# Patient Record
Sex: FEMALE | Race: WHITE | NOT HISPANIC OR LATINO
[De-identification: names, ages, dates, MRNs, and addresses within clinical notes are randomized per-mention and may not be internally consistent; named-entity substitution may affect disease eponyms.]

---

## 2021-10-05 ENCOUNTER — ASOB RESULT (OUTPATIENT)
Age: 26
End: 2021-10-05

## 2021-10-05 ENCOUNTER — TRANSCRIPTION ENCOUNTER (OUTPATIENT)
Age: 26
End: 2021-10-05

## 2021-10-05 ENCOUNTER — APPOINTMENT (OUTPATIENT)
Dept: ANTEPARTUM | Facility: CLINIC | Age: 26
End: 2021-10-05
Payer: MEDICAID

## 2021-10-05 PROCEDURE — 76813 OB US NUCHAL MEAS 1 GEST: CPT

## 2021-10-05 PROCEDURE — 76801 OB US < 14 WKS SINGLE FETUS: CPT

## 2021-10-05 PROCEDURE — 36416 COLLJ CAPILLARY BLOOD SPEC: CPT

## 2021-11-23 ENCOUNTER — ASOB RESULT (OUTPATIENT)
Age: 26
End: 2021-11-23

## 2021-11-23 ENCOUNTER — APPOINTMENT (OUTPATIENT)
Dept: ANTEPARTUM | Facility: CLINIC | Age: 26
End: 2021-11-23
Payer: COMMERCIAL

## 2021-11-23 PROCEDURE — 76805 OB US >/= 14 WKS SNGL FETUS: CPT

## 2021-11-26 ENCOUNTER — APPOINTMENT (OUTPATIENT)
Dept: ANTEPARTUM | Facility: CLINIC | Age: 26
End: 2021-11-26

## 2021-12-06 ENCOUNTER — NON-APPOINTMENT (OUTPATIENT)
Age: 26
End: 2021-12-06

## 2021-12-15 ENCOUNTER — APPOINTMENT (OUTPATIENT)
Dept: CARDIOLOGY | Facility: CLINIC | Age: 26
End: 2021-12-15
Payer: COMMERCIAL

## 2021-12-15 ENCOUNTER — NON-APPOINTMENT (OUTPATIENT)
Age: 26
End: 2021-12-15

## 2021-12-15 VITALS
DIASTOLIC BLOOD PRESSURE: 62 MMHG | WEIGHT: 117 LBS | TEMPERATURE: 97.9 F | BODY MASS INDEX: 21.53 KG/M2 | OXYGEN SATURATION: 99 % | SYSTOLIC BLOOD PRESSURE: 100 MMHG | HEART RATE: 76 BPM | HEIGHT: 62 IN

## 2021-12-15 DIAGNOSIS — Z00.00 ENCOUNTER FOR GENERAL ADULT MEDICAL EXAMINATION W/OUT ABNORMAL FINDINGS: ICD-10-CM

## 2021-12-15 DIAGNOSIS — R00.2 PALPITATIONS: ICD-10-CM

## 2021-12-15 DIAGNOSIS — R06.02 SHORTNESS OF BREATH: ICD-10-CM

## 2021-12-15 DIAGNOSIS — R53.83 OTHER FATIGUE: ICD-10-CM

## 2021-12-15 PROCEDURE — 93000 ELECTROCARDIOGRAM COMPLETE: CPT

## 2021-12-15 PROCEDURE — 99214 OFFICE O/P EST MOD 30 MIN: CPT

## 2021-12-15 RX ORDER — DOXYLAMINE SUCCINATE 25 MG
25 TABLET ORAL
Refills: 0 | Status: ACTIVE | COMMUNITY
Start: 2021-12-15

## 2021-12-15 RX ORDER — LORATADINE 10 MG
27-0.8 TABLET ORAL
Refills: 0 | Status: ACTIVE | COMMUNITY

## 2021-12-15 NOTE — REVIEW OF SYSTEMS
[Feeling Fatigued] : feeling fatigued [SOB] : shortness of breath [Palpitations] : palpitations [Negative] : Heme/Lymph

## 2021-12-16 NOTE — HISTORY OF PRESENT ILLNESS
[FreeTextEntry1] : Pt presents as new pt referred by Dr. Kulkarni. \par \par Pt is currently 22 weeks pregnant and states that for the past 16 weeks she has been experiencing generalized fatigue and weakness. States that after she does normal activities like walking her dog she feels like she has to lay down. pt also states she has been experiencing a "racing heart beat" and shortness of breath on exertion. Pt denies any chest pain, dizziness, nausea/vomiting, abdominal pain, fever or cough.

## 2021-12-17 ENCOUNTER — APPOINTMENT (OUTPATIENT)
Dept: CARDIOLOGY | Facility: CLINIC | Age: 26
End: 2021-12-17
Payer: COMMERCIAL

## 2021-12-17 LAB
25(OH)D3 SERPL-MCNC: 45.2 NG/ML
ALBUMIN SERPL ELPH-MCNC: 4 G/DL
ALP BLD-CCNC: 58 U/L
ALT SERPL-CCNC: 154 U/L
ANION GAP SERPL CALC-SCNC: 10 MMOL/L
AST SERPL-CCNC: 63 U/L
BASOPHILS # BLD AUTO: 0.02 K/UL
BASOPHILS NFR BLD AUTO: 0.3 %
BILIRUB SERPL-MCNC: 0.2 MG/DL
BUN SERPL-MCNC: 11 MG/DL
CALCIUM SERPL-MCNC: 9.2 MG/DL
CHLORIDE SERPL-SCNC: 103 MMOL/L
CO2 SERPL-SCNC: 23 MMOL/L
CREAT SERPL-MCNC: 0.5 MG/DL
EOSINOPHIL # BLD AUTO: 0.14 K/UL
EOSINOPHIL NFR BLD AUTO: 1.8 %
FERRITIN SERPL-MCNC: 36 NG/ML
FOLATE SERPL-MCNC: >20 NG/ML
GLUCOSE SERPL-MCNC: 73 MG/DL
HCT VFR BLD CALC: 38 %
HGB BLD-MCNC: 12 G/DL
IMM GRANULOCYTES NFR BLD AUTO: 0.8 %
IRON SERPL-MCNC: 105 UG/DL
LYMPHOCYTES # BLD AUTO: 1.81 K/UL
LYMPHOCYTES NFR BLD AUTO: 22.6 %
MAN DIFF?: NORMAL
MCHC RBC-ENTMCNC: 30.4 PG
MCHC RBC-ENTMCNC: 31.6 GM/DL
MCV RBC AUTO: 96.2 FL
MONOCYTES # BLD AUTO: 0.63 K/UL
MONOCYTES NFR BLD AUTO: 7.9 %
NEUTROPHILS # BLD AUTO: 5.34 K/UL
NEUTROPHILS NFR BLD AUTO: 66.6 %
PLATELET # BLD AUTO: 259 K/UL
POTASSIUM SERPL-SCNC: 5 MMOL/L
PROT SERPL-MCNC: 6.7 G/DL
RBC # BLD: 3.95 M/UL
RBC # FLD: 13.6 %
SODIUM SERPL-SCNC: 136 MMOL/L
T3FREE SERPL-MCNC: 2.64 PG/ML
T4 FREE SERPL-MCNC: 1 NG/DL
TSH SERPL-ACNC: 0.63 UIU/ML
VIT B12 SERPL-MCNC: 924 PG/ML
WBC # FLD AUTO: 8 K/UL

## 2021-12-17 PROCEDURE — 93306 TTE W/DOPPLER COMPLETE: CPT

## 2022-01-03 ENCOUNTER — NON-APPOINTMENT (OUTPATIENT)
Age: 27
End: 2022-01-03

## 2022-02-03 ENCOUNTER — ASOB RESULT (OUTPATIENT)
Age: 27
End: 2022-02-03

## 2022-02-03 ENCOUNTER — APPOINTMENT (OUTPATIENT)
Dept: ANTEPARTUM | Facility: CLINIC | Age: 27
End: 2022-02-03
Payer: COMMERCIAL

## 2022-02-03 PROCEDURE — 76819 FETAL BIOPHYS PROFIL W/O NST: CPT

## 2022-02-03 PROCEDURE — 76816 OB US FOLLOW-UP PER FETUS: CPT

## 2022-02-17 ENCOUNTER — APPOINTMENT (OUTPATIENT)
Dept: CARDIOLOGY | Facility: CLINIC | Age: 27
End: 2022-02-17

## 2022-03-01 ENCOUNTER — ASOB RESULT (OUTPATIENT)
Age: 27
End: 2022-03-01

## 2022-03-01 ENCOUNTER — APPOINTMENT (OUTPATIENT)
Dept: ANTEPARTUM | Facility: CLINIC | Age: 27
End: 2022-03-01
Payer: COMMERCIAL

## 2022-03-01 PROCEDURE — 76816 OB US FOLLOW-UP PER FETUS: CPT

## 2022-03-01 PROCEDURE — 76819 FETAL BIOPHYS PROFIL W/O NST: CPT

## 2022-04-15 ENCOUNTER — INPATIENT (INPATIENT)
Facility: HOSPITAL | Age: 27
LOS: 2 days | Discharge: HOME | End: 2022-04-18
Attending: SPECIALIST | Admitting: SPECIALIST
Payer: COMMERCIAL

## 2022-04-15 VITALS — HEART RATE: 93 BPM | DIASTOLIC BLOOD PRESSURE: 82 MMHG | SYSTOLIC BLOOD PRESSURE: 120 MMHG

## 2022-04-15 DIAGNOSIS — O26.899 OTHER SPECIFIED PREGNANCY RELATED CONDITIONS, UNSPECIFIED TRIMESTER: ICD-10-CM

## 2022-04-15 DIAGNOSIS — Z3A.00 WEEKS OF GESTATION OF PREGNANCY NOT SPECIFIED: ICD-10-CM

## 2022-04-15 NOTE — OB RN TRIAGE NOTE - FALL HARM RISK - UNIVERSAL INTERVENTIONS
Bed in lowest position, wheels locked, appropriate side rails in place/Call bell, personal items and telephone in reach/Instruct patient to call for assistance before getting out of bed or chair/Non-slip footwear when patient is out of bed/[place] to call system/Physically safe environment - no spills, clutter or unnecessary equipment/Purposeful Proactive Rounding/Room/bathroom lighting operational, light cord in reach

## 2022-04-16 ENCOUNTER — TRANSCRIPTION ENCOUNTER (OUTPATIENT)
Age: 27
End: 2022-04-16

## 2022-04-16 DIAGNOSIS — O42.90 PREMATURE RUPTURE OF MEMBRANES, UNSPECIFIED AS TO LENGTH OF TIME BETWEEN RUPTURE AND ONSET OF LABOR, UNSPECIFIED WEEKS OF GESTATION: ICD-10-CM

## 2022-04-16 LAB
BASOPHILS # BLD AUTO: 0.02 K/UL — SIGNIFICANT CHANGE UP (ref 0–0.2)
BASOPHILS NFR BLD AUTO: 0.2 % — SIGNIFICANT CHANGE UP (ref 0–2)
COVID-19 SPIKE DOMAIN AB INTERP: NEGATIVE — SIGNIFICANT CHANGE UP
COVID-19 SPIKE DOMAIN ANTIBODY RESULT: 0.4 U/ML — SIGNIFICANT CHANGE UP
EOSINOPHIL # BLD AUTO: 0.12 K/UL — SIGNIFICANT CHANGE UP (ref 0–0.5)
EOSINOPHIL NFR BLD AUTO: 1.4 % — SIGNIFICANT CHANGE UP (ref 0–6)
HCT VFR BLD CALC: 37.6 % — SIGNIFICANT CHANGE UP (ref 34.5–45)
HGB BLD-MCNC: 12.5 G/DL — SIGNIFICANT CHANGE UP (ref 11.5–15.5)
IANC: 5.98 K/UL — SIGNIFICANT CHANGE UP (ref 1.8–7.4)
IMM GRANULOCYTES NFR BLD AUTO: 0.4 % — SIGNIFICANT CHANGE UP (ref 0–1.5)
LYMPHOCYTES # BLD AUTO: 1.8 K/UL — SIGNIFICANT CHANGE UP (ref 1–3.3)
LYMPHOCYTES # BLD AUTO: 21 % — SIGNIFICANT CHANGE UP (ref 13–44)
MCHC RBC-ENTMCNC: 30.8 PG — SIGNIFICANT CHANGE UP (ref 27–34)
MCHC RBC-ENTMCNC: 33.2 GM/DL — SIGNIFICANT CHANGE UP (ref 32–36)
MCV RBC AUTO: 92.6 FL — SIGNIFICANT CHANGE UP (ref 80–100)
MONOCYTES # BLD AUTO: 0.61 K/UL — SIGNIFICANT CHANGE UP (ref 0–0.9)
MONOCYTES NFR BLD AUTO: 7.1 % — SIGNIFICANT CHANGE UP (ref 2–14)
NEUTROPHILS # BLD AUTO: 5.98 K/UL — SIGNIFICANT CHANGE UP (ref 1.8–7.4)
NEUTROPHILS NFR BLD AUTO: 69.9 % — SIGNIFICANT CHANGE UP (ref 43–77)
NRBC # BLD: 0 /100 WBCS — SIGNIFICANT CHANGE UP
NRBC # FLD: 0 K/UL — SIGNIFICANT CHANGE UP
PLATELET # BLD AUTO: 212 K/UL — SIGNIFICANT CHANGE UP (ref 150–400)
RBC # BLD: 4.06 M/UL — SIGNIFICANT CHANGE UP (ref 3.8–5.2)
RBC # FLD: 14.1 % — SIGNIFICANT CHANGE UP (ref 10.3–14.5)
SARS-COV-2 IGG+IGM SERPL QL IA: 0.4 U/ML — SIGNIFICANT CHANGE UP
SARS-COV-2 IGG+IGM SERPL QL IA: NEGATIVE — SIGNIFICANT CHANGE UP
SARS-COV-2 RNA SPEC QL NAA+PROBE: SIGNIFICANT CHANGE UP
T PALLIDUM AB TITR SER: NEGATIVE — SIGNIFICANT CHANGE UP
WBC # BLD: 8.56 K/UL — SIGNIFICANT CHANGE UP (ref 3.8–10.5)
WBC # FLD AUTO: 8.56 K/UL — SIGNIFICANT CHANGE UP (ref 3.8–10.5)

## 2022-04-16 PROCEDURE — 86077 PHYS BLOOD BANK SERV XMATCH: CPT

## 2022-04-16 RX ORDER — SODIUM CHLORIDE 9 MG/ML
1000 INJECTION, SOLUTION INTRAVENOUS ONCE
Refills: 0 | Status: COMPLETED | OUTPATIENT
Start: 2022-04-16 | End: 2022-04-16

## 2022-04-16 RX ORDER — OXYTOCIN 10 UNIT/ML
2 VIAL (ML) INJECTION
Qty: 30 | Refills: 0 | Status: DISCONTINUED | OUTPATIENT
Start: 2022-04-16 | End: 2022-04-16

## 2022-04-16 RX ORDER — LANOLIN
1 OINTMENT (GRAM) TOPICAL EVERY 6 HOURS
Refills: 0 | Status: DISCONTINUED | OUTPATIENT
Start: 2022-04-16 | End: 2022-04-18

## 2022-04-16 RX ORDER — IBUPROFEN 200 MG
600 TABLET ORAL EVERY 6 HOURS
Refills: 0 | Status: COMPLETED | OUTPATIENT
Start: 2022-04-16 | End: 2023-03-15

## 2022-04-16 RX ORDER — OXYTOCIN 10 UNIT/ML
333.33 VIAL (ML) INJECTION
Qty: 20 | Refills: 0 | Status: DISCONTINUED | OUTPATIENT
Start: 2022-04-16 | End: 2022-04-18

## 2022-04-16 RX ORDER — CHLORHEXIDINE GLUCONATE 213 G/1000ML
1 SOLUTION TOPICAL DAILY
Refills: 0 | Status: DISCONTINUED | OUTPATIENT
Start: 2022-04-16 | End: 2022-04-16

## 2022-04-16 RX ORDER — ONDANSETRON 8 MG/1
4 TABLET, FILM COATED ORAL
Refills: 0 | Status: DISCONTINUED | OUTPATIENT
Start: 2022-04-16 | End: 2022-04-16

## 2022-04-16 RX ORDER — SODIUM CHLORIDE 9 MG/ML
1000 INJECTION, SOLUTION INTRAVENOUS
Refills: 0 | Status: DISCONTINUED | OUTPATIENT
Start: 2022-04-16 | End: 2022-04-16

## 2022-04-16 RX ORDER — KETOROLAC TROMETHAMINE 30 MG/ML
30 SYRINGE (ML) INJECTION ONCE
Refills: 0 | Status: DISCONTINUED | OUTPATIENT
Start: 2022-04-16 | End: 2022-04-16

## 2022-04-16 RX ORDER — HYDROCORTISONE 1 %
1 OINTMENT (GRAM) TOPICAL EVERY 6 HOURS
Refills: 0 | Status: DISCONTINUED | OUTPATIENT
Start: 2022-04-16 | End: 2022-04-18

## 2022-04-16 RX ORDER — OXYCODONE HYDROCHLORIDE 5 MG/1
5 TABLET ORAL ONCE
Refills: 0 | Status: DISCONTINUED | OUTPATIENT
Start: 2022-04-16 | End: 2022-04-18

## 2022-04-16 RX ORDER — DIPHENHYDRAMINE HCL 50 MG
25 CAPSULE ORAL EVERY 6 HOURS
Refills: 0 | Status: DISCONTINUED | OUTPATIENT
Start: 2022-04-16 | End: 2022-04-18

## 2022-04-16 RX ORDER — AER TRAVELER 0.5 G/1
1 SOLUTION RECTAL; TOPICAL EVERY 4 HOURS
Refills: 0 | Status: DISCONTINUED | OUTPATIENT
Start: 2022-04-16 | End: 2022-04-18

## 2022-04-16 RX ORDER — SODIUM CHLORIDE 9 MG/ML
3 INJECTION INTRAMUSCULAR; INTRAVENOUS; SUBCUTANEOUS EVERY 8 HOURS
Refills: 0 | Status: DISCONTINUED | OUTPATIENT
Start: 2022-04-16 | End: 2022-04-18

## 2022-04-16 RX ORDER — DIBUCAINE 1 %
1 OINTMENT (GRAM) RECTAL EVERY 6 HOURS
Refills: 0 | Status: DISCONTINUED | OUTPATIENT
Start: 2022-04-16 | End: 2022-04-18

## 2022-04-16 RX ORDER — ONDANSETRON 8 MG/1
4 TABLET, FILM COATED ORAL ONCE
Refills: 0 | Status: COMPLETED | OUTPATIENT
Start: 2022-04-16 | End: 2022-04-16

## 2022-04-16 RX ORDER — SIMETHICONE 80 MG/1
80 TABLET, CHEWABLE ORAL EVERY 4 HOURS
Refills: 0 | Status: DISCONTINUED | OUTPATIENT
Start: 2022-04-16 | End: 2022-04-18

## 2022-04-16 RX ORDER — PRAMOXINE HYDROCHLORIDE 150 MG/15G
1 AEROSOL, FOAM RECTAL EVERY 4 HOURS
Refills: 0 | Status: DISCONTINUED | OUTPATIENT
Start: 2022-04-16 | End: 2022-04-18

## 2022-04-16 RX ORDER — METOCLOPRAMIDE HCL 10 MG
10 TABLET ORAL ONCE
Refills: 0 | Status: COMPLETED | OUTPATIENT
Start: 2022-04-16 | End: 2022-04-16

## 2022-04-16 RX ORDER — OXYTOCIN 10 UNIT/ML
333.33 VIAL (ML) INJECTION
Qty: 20 | Refills: 0 | Status: DISCONTINUED | OUTPATIENT
Start: 2022-04-16 | End: 2022-04-16

## 2022-04-16 RX ORDER — OXYCODONE HYDROCHLORIDE 5 MG/1
5 TABLET ORAL
Refills: 0 | Status: DISCONTINUED | OUTPATIENT
Start: 2022-04-16 | End: 2022-04-18

## 2022-04-16 RX ORDER — ACETAMINOPHEN 500 MG
975 TABLET ORAL
Refills: 0 | Status: DISCONTINUED | OUTPATIENT
Start: 2022-04-16 | End: 2022-04-18

## 2022-04-16 RX ORDER — BENZOCAINE 10 %
1 GEL (GRAM) MUCOUS MEMBRANE EVERY 6 HOURS
Refills: 0 | Status: DISCONTINUED | OUTPATIENT
Start: 2022-04-16 | End: 2022-04-18

## 2022-04-16 RX ORDER — TETANUS TOXOID, REDUCED DIPHTHERIA TOXOID AND ACELLULAR PERTUSSIS VACCINE, ADSORBED 5; 2.5; 8; 8; 2.5 [IU]/.5ML; [IU]/.5ML; UG/.5ML; UG/.5ML; UG/.5ML
0.5 SUSPENSION INTRAMUSCULAR ONCE
Refills: 0 | Status: DISCONTINUED | OUTPATIENT
Start: 2022-04-16 | End: 2022-04-18

## 2022-04-16 RX ORDER — MAGNESIUM HYDROXIDE 400 MG/1
30 TABLET, CHEWABLE ORAL
Refills: 0 | Status: DISCONTINUED | OUTPATIENT
Start: 2022-04-16 | End: 2022-04-18

## 2022-04-16 RX ADMIN — Medication 30 MILLIGRAM(S): at 20:21

## 2022-04-16 RX ADMIN — CHLORHEXIDINE GLUCONATE 1 APPLICATION(S): 213 SOLUTION TOPICAL at 01:30

## 2022-04-16 RX ADMIN — Medication 1000 MILLIUNIT(S)/MIN: at 19:59

## 2022-04-16 RX ADMIN — ONDANSETRON 4 MILLIGRAM(S): 8 TABLET, FILM COATED ORAL at 23:00

## 2022-04-16 RX ADMIN — SODIUM CHLORIDE 1000 MILLILITER(S): 9 INJECTION, SOLUTION INTRAVENOUS at 21:09

## 2022-04-16 RX ADMIN — SODIUM CHLORIDE 2000 MILLILITER(S): 9 INJECTION, SOLUTION INTRAVENOUS at 01:50

## 2022-04-16 RX ADMIN — Medication 30 MILLIGRAM(S): at 21:02

## 2022-04-16 RX ADMIN — Medication 10 MILLIGRAM(S): at 11:50

## 2022-04-16 RX ADMIN — Medication 2 MILLIUNIT(S)/MIN: at 17:16

## 2022-04-16 RX ADMIN — ONDANSETRON 4 MILLIGRAM(S): 8 TABLET, FILM COATED ORAL at 06:47

## 2022-04-16 RX ADMIN — ONDANSETRON 4 MILLIGRAM(S): 8 TABLET, FILM COATED ORAL at 09:56

## 2022-04-16 NOTE — DISCHARGE NOTE OB - CARE PROVIDER_API CALL
Darin Kulkarni)  Obstetrics and Gynecology  [address] [place] [address]  Phone: [phone]  Fax: [phone]  Follow Up Time:

## 2022-04-16 NOTE — OB PROVIDER LABOR PROGRESS NOTE - ASSESSMENT
Due to pt hx of vaginismus plan for anesthesia top off and high sheridan's to labor down     Alicia Eaton, PGY1  d/w Dr. Jackson
A/P: 26y P0 admitted for IOL for PROM   - BC for induction      - s/p epidural   - continuous monitoring/toco   - maternal repositioning/fluids prn for resuscitation     dw Dr. mary Matias PGY1

## 2022-04-16 NOTE — OB PROVIDER TRIAGE NOTE - NSHPPHYSICALEXAM_GEN_ALL_CORE
abd soft gravid NT  TAS: vertex  + nitrazine  SVE: unable to obtain 2/2 vaginismus  FHT: moderate variability, + accelerations, negative decelerations  toco: irregular q 25 min  Vital Signs Last 24 Hrs  T(C): 36.7 (15 Apr 2022 23:48), Max: 36.7 (15 Apr 2022 23:48)  T(F): 98.1 (15 Apr 2022 23:48), Max: 98.1 (15 Apr 2022 23:48)  HR: 93 (15 Apr 2022 23:48) (93 - 93)  BP: 120/82 (15 Apr 2022 23:48) (120/82 - 120/82)  BP(mean): --  RR: 18 (15 Apr 2022 23:48) (18 - 18)  SpO2: --

## 2022-04-16 NOTE — OB POSTPARTUM EVENT NOTE - NS_EVENTPTSUMMARY1_OBGYN_ALL_OB_FT
orthostatic VS  laying- 119/75  P 59  sitting- 112/62  P 68  standing- 88/50 P 93  Pt admits to feeling lightheaded upon standing

## 2022-04-16 NOTE — OB PROVIDER TRIAGE NOTE - NSOBPROVIDERNOTE_OBGYN_ALL_OB_FT
27 yo  @ 40wks c/o SROM @ 2140 clear fluid. denies vb, reports contractions and +GFM. AP course complicated by vaginismus. denies fever chills ha n/v new swelling vision changes epigastric pain cp sob or cough. last saw OB Monday not dilated.    GBS; negative  meds: PNV Vit B6 Unisom  all: denies  PMH: vaginismus  PSH: denies  gyn hx: abn pap  ob hx: denies  d/w Dr Gale admit for SROM @ 40 wks  for epidural for pain control and vaginismus  vaginal exam after epidural  prenatals reviewed  covid swab sent

## 2022-04-16 NOTE — DISCHARGE NOTE OB - MATERIALS PROVIDED
Vaccinations/Health system  Screening Program/  Immunization Record/Breastfeeding Mother’s Support Group Information/Guide to Postpartum Care/Health system Hearing Screen Program/Back To Sleep Handout/Shaken Baby Prevention Handout/Birth Certificate Instructions/Discharge Medication Information for Patients and Families Pocket Guide

## 2022-04-16 NOTE — OB RN DELIVERY SUMMARY - NSSELHIDDEN_OBGYN_ALL_OB_FT
[NS_DeliveryAttending1_OBGYN_ALL_OB_FT:[email or web address]],[NS_DeliveryAttending2_OBGYN_ALL_OB_FT:[email or web address]=],[NS_DeliveryRN_OBGYN_ALL_OB_FT:[id number]=],[NS_CirculateRN2_OBGYN_ALL_OB_FT:[id number]=]

## 2022-04-16 NOTE — DISCHARGE NOTE OB - MEDICATION SUMMARY - MEDICATIONS TO TAKE
I will START or STAY ON the medications listed below when I get home from the hospital:    ibuprofen 600 mg oral tablet  -- 1 tab(s) by mouth every 6 hours  -- Indication: For pain    acetaminophen 325 mg oral tablet  -- 3 tab(s) by mouth 3 times a day, As Needed - for moderate pain  -- Indication: For pain

## 2022-04-16 NOTE — DISCHARGE NOTE OB - NS MD DC FALL RISK RISK
For information on Fall & Injury Prevention, visit: [email or web address] OR  [email or web address] OR  https://www.cdc.gov/steadi/patient.html

## 2022-04-16 NOTE — OB PROVIDER H&P - ASSESSMENT
25 yo  @ 40wks c/o SROM @ 2140 clear fluid. denies vb, reports contractions and +GFM. AP course complicated by vaginismus. denies fever chills ha n/v new swelling vision changes epigastric pain cp sob or cough. last saw OB Monday not dilated.    GBS; negative  meds: PNV Vit B6 Unisom  all: denies  PMH: vaginismus  PSH: denies  gyn hx: abn pap  ob hx: denies    d/w Dr Gale admit for SROM @ 40 wks  for epidural for pain control and vaginismus  vaginal exam after epidural  prenatals reviewed  covid swab sent

## 2022-04-16 NOTE — OB PROVIDER LABOR PROGRESS NOTE - NS_SUBJECTIVE/OBJECTIVE_OBGYN_ALL_OB_FT
VE due to pt feeling rectal pressure
PGY1 Labor Note    Patient seen and evaluated at bedside    T(C): 36.6 (04-16-22 @ 03:37), Max: 36.7 (04-15-22 @ 23:48)  HR: 81 (04-16-22 @ 03:42) (76 - 104)  BP: 161/86 (04-16-22 @ 03:34) (111/81 - 161/86)  RR: 16 (04-16-22 @ 02:11) (16 - 18)  SpO2: 100% (04-16-22 @ 03:42) (98% - 100%)

## 2022-04-16 NOTE — OB RN DELIVERY SUMMARY - NS_SEPSISRSKCALC_OBGYN_ALL_OB_FT
EOS calculated successfully. EOS Risk Factor: 0.5/1000 live births ([facility] national incidence); GA=40w;Temp=98.78; ROM=21.033; GBS='Negative'; Antibiotics='No antibiotics or any antibiotics < 2 hrs prior to birth'

## 2022-04-16 NOTE — OB PROVIDER DELIVERY SUMMARY - NSPROVIDERDELIVERYNOTE_OBGYN_ALL_OB_FT
A Live baby boy delivered vaginally.  Placenta & membranes delivered spontaneously & intact.  Cervix & vagina inspected -second degree perineal laceration noted.   Repaired with chromic catgut.   Hemostasis assured.   Mother & baby in stable condition.

## 2022-04-16 NOTE — OB POSTPARTUM EVENT NOTE - NS_EVENTFINDINGS1_OBGYN_ALL_OB_FT
Bleeding, orthostatic vital signs and pt symptoms reported to MD Liz. To give liter bolus and encourage PO hydration as per MD and to recheck VS after.

## 2022-04-16 NOTE — OB RN PATIENT PROFILE - BREAST MILK IS MORE DIGESTIBLE, MAKING VOMITING, DIARRHEA, GAS AND CONSTIPATION LESS COMMON
Recent PHQ 2/9 Score    PHQ 2:  Date Adult PHQ 2 Score Adult PHQ 2 Interpretation   1/18/2021 0 No further screening needed       PHQ 9:      Rooming documentation of social history includes tobacco screening only.     Statement Selected

## 2022-04-16 NOTE — DISCHARGE NOTE OB - PATIENT PORTAL LINK FT
You can access the FollowMyHealth Patient Portal offered by [facility] by registering at the following website: [email or web address]. By joining [facility]’s FollowMyHealth portal, you will also be able to view your health information using other applications (apps) compatible with our system.

## 2022-04-16 NOTE — OB PROVIDER TRIAGE NOTE - HISTORY OF PRESENT ILLNESS
25 yo  @ 40wks c/o SROM @ 2140 clear fluid. denies vb, reports contractions and +GFM. AP course complicated by vaginismus. denies fever chills ha n/v new swelling vision changes epigastric pain cp sob or cough. last saw OB Monday not dilated.    GBS; negative  meds: PNV Vit B6 Unisom  all: denies  PMH: vaginismus  PSH: denies  gyn hx: abn pap  ob hx: denies

## 2022-04-16 NOTE — OB PROVIDER H&P - HISTORY OF PRESENT ILLNESS
27 yo  @ 40wks c/o SROM @ 2140 clear fluid. denies vb, reports contractions and +GFM. AP course complicated by vaginismus. denies fever chills ha n/v new swelling vision changes epigastric pain cp sob or cough. last saw OB Monday not dilated.    GBS; negative  meds: PNV Vit B6 Unisom  all: denies  PMH: vaginismus  PSH: denies  gyn hx: abn pap  ob hx: denies

## 2022-04-17 LAB
HCT VFR BLD CALC: 36.3 % — SIGNIFICANT CHANGE UP (ref 34.5–45)
HGB BLD-MCNC: 11.8 G/DL — SIGNIFICANT CHANGE UP (ref 11.5–15.5)
MCHC RBC-ENTMCNC: 31.1 PG — SIGNIFICANT CHANGE UP (ref 27–34)
MCHC RBC-ENTMCNC: 32.5 GM/DL — SIGNIFICANT CHANGE UP (ref 32–36)
MCV RBC AUTO: 95.8 FL — SIGNIFICANT CHANGE UP (ref 80–100)
NRBC # BLD: 0 /100 WBCS — SIGNIFICANT CHANGE UP
NRBC # FLD: 0 K/UL — SIGNIFICANT CHANGE UP
PLATELET # BLD AUTO: 208 K/UL — SIGNIFICANT CHANGE UP (ref 150–400)
RBC # BLD: 3.79 M/UL — LOW (ref 3.8–5.2)
RBC # FLD: 14.1 % — SIGNIFICANT CHANGE UP (ref 10.3–14.5)
WBC # BLD: 21.23 K/UL — HIGH (ref 3.8–10.5)
WBC # FLD AUTO: 21.23 K/UL — HIGH (ref 3.8–10.5)

## 2022-04-17 RX ORDER — ACETAMINOPHEN 500 MG
3 TABLET ORAL
Qty: 0 | Refills: 0 | DISCHARGE
Start: 2022-04-17

## 2022-04-17 RX ORDER — IBUPROFEN 200 MG
1 TABLET ORAL
Qty: 0 | Refills: 0 | DISCHARGE
Start: 2022-04-17

## 2022-04-17 RX ORDER — DOXYLAMINE SUCCINATE 25 MG
1 TABLET ORAL
Qty: 0 | Refills: 0 | DISCHARGE

## 2022-04-17 RX ORDER — IBUPROFEN 200 MG
600 TABLET ORAL EVERY 6 HOURS
Refills: 0 | Status: DISCONTINUED | OUTPATIENT
Start: 2022-04-17 | End: 2022-04-18

## 2022-04-17 RX ORDER — PYRIDOXINE HCL (VITAMIN B6) 100 MG
1 TABLET ORAL
Qty: 0 | Refills: 0 | DISCHARGE

## 2022-04-17 RX ORDER — METOCLOPRAMIDE HCL 10 MG
10 TABLET ORAL ONCE
Refills: 0 | Status: COMPLETED | OUTPATIENT
Start: 2022-04-17 | End: 2022-04-17

## 2022-04-17 RX ADMIN — Medication 600 MILLIGRAM(S): at 07:05

## 2022-04-17 RX ADMIN — Medication 600 MILLIGRAM(S): at 06:25

## 2022-04-17 RX ADMIN — SODIUM CHLORIDE 3 MILLILITER(S): 9 INJECTION INTRAMUSCULAR; INTRAVENOUS; SUBCUTANEOUS at 06:47

## 2022-04-17 RX ADMIN — Medication 975 MILLIGRAM(S): at 02:59

## 2022-04-17 RX ADMIN — Medication 975 MILLIGRAM(S): at 03:46

## 2022-04-17 RX ADMIN — Medication 10 MILLIGRAM(S): at 02:59

## 2022-04-17 RX ADMIN — MAGNESIUM HYDROXIDE 30 MILLILITER(S): 400 TABLET, CHEWABLE ORAL at 20:51

## 2022-04-17 NOTE — PROGRESS NOTE ADULT - SUBJECTIVE AND OBJECTIVE BOX
S: Patient doing well. Minimal lochia. Pain controlled. breastfeeding/bottle    O: Vital Signs Last 24 Hrs  T(C): 36.7 (2022 05:43), Max: 37.1 (2022 13:42)  T(F): 98 (2022 05:43), Max: 98.78 (2022 13:42)  HR: 67 (2022 05:43) (53 - 114)  BP: 96/50 (2022 05:43) (88/50 - 164/68)  BP(mean): --  RR: 18 (2022 05:43) (18 - 18)  SpO2: 98% (2022 05:43) (74% - 100%)    Gen: NAD  Abd: soft, NT, ND, fundus firm below umbilicus  Lochia: moderate  Ext: no tenderness    Labs:                        11.8   21.23 )-----------( 208      ( 2022 00:04 )             36.3       A: 26y PPD#1 s/p  doing well.     Plan: Continue routine postpartum care.

## 2022-04-18 VITALS
HEART RATE: 62 BPM | SYSTOLIC BLOOD PRESSURE: 109 MMHG | OXYGEN SATURATION: 99 % | RESPIRATION RATE: 18 BRPM | DIASTOLIC BLOOD PRESSURE: 72 MMHG | TEMPERATURE: 98 F

## 2022-04-18 DIAGNOSIS — O42.10 PREMATURE RUPTURE OF MEMBRANES, ONSET OF LABOR MORE THAN 24 HOURS FOLLOWING RUPTURE, UNSPECIFIED WEEKS OF GESTATION: ICD-10-CM

## 2022-04-18 LAB
HCT VFR BLD CALC: 34.4 % — LOW (ref 34.5–45)
HGB BLD-MCNC: 11.2 G/DL — LOW (ref 11.5–15.5)
MCHC RBC-ENTMCNC: 31 PG — SIGNIFICANT CHANGE UP (ref 27–34)
MCHC RBC-ENTMCNC: 32.6 GM/DL — SIGNIFICANT CHANGE UP (ref 32–36)
MCV RBC AUTO: 95.3 FL — SIGNIFICANT CHANGE UP (ref 80–100)
NRBC # BLD: 0 /100 WBCS — SIGNIFICANT CHANGE UP
NRBC # FLD: 0 K/UL — SIGNIFICANT CHANGE UP
PLATELET # BLD AUTO: 213 K/UL — SIGNIFICANT CHANGE UP (ref 150–400)
RBC # BLD: 3.61 M/UL — LOW (ref 3.8–5.2)
RBC # FLD: 14.3 % — SIGNIFICANT CHANGE UP (ref 10.3–14.5)
WBC # BLD: 10.05 K/UL — SIGNIFICANT CHANGE UP (ref 3.8–10.5)
WBC # FLD AUTO: 10.05 K/UL — SIGNIFICANT CHANGE UP (ref 3.8–10.5)

## 2022-04-18 RX ORDER — METOCLOPRAMIDE HCL 10 MG
1 TABLET ORAL
Qty: 6 | Refills: 0
Start: 2022-04-18 | End: 2022-04-20

## 2022-04-18 RX ORDER — METOCLOPRAMIDE HCL 10 MG
5 TABLET ORAL ONCE
Refills: 0 | Status: COMPLETED | OUTPATIENT
Start: 2022-04-18 | End: 2022-04-18

## 2022-04-18 RX ADMIN — Medication 5 MILLIGRAM(S): at 10:40

## 2022-04-18 RX ADMIN — Medication 975 MILLIGRAM(S): at 08:30

## 2022-04-18 RX ADMIN — Medication 975 MILLIGRAM(S): at 08:53

## 2022-04-18 RX ADMIN — Medication 975 MILLIGRAM(S): at 02:58

## 2022-04-18 RX ADMIN — Medication 975 MILLIGRAM(S): at 03:35

## 2022-04-18 RX ADMIN — Medication 600 MILLIGRAM(S): at 06:09

## 2022-04-18 NOTE — PROGRESS NOTE ADULT - SUBJECTIVE AND OBJECTIVE BOX
Post partum Day 1      Pt without complaints    Vital Signs Last 24 Hrs  T(C): 36.8 (18 Apr 2022 04:56), Max: 36.8 (17 Apr 2022 19:06)  T(F): 98.3 (18 Apr 2022 04:56), Max: 98.3 (18 Apr 2022 04:56)  HR: 66 (18 Apr 2022 04:56) (62 - 75)  BP: 107/74 (18 Apr 2022 04:56) (101/65 - 111/64)  BP(mean): --  RR: 18 (18 Apr 2022 04:56) (16 - 18)  SpO2: 98% (18 Apr 2022 04:56) (98% - 100%)                        11.8   21.23 )-----------( 208      ( 17 Apr 2022 00:04 )             36.3     MEDICATIONS  (STANDING):  acetaminophen     Tablet .. 975 milliGRAM(s) Oral <User Schedule>  diphtheria/tetanus/pertussis (acellular) Vaccine (ADAcel) 0.5 milliLiter(s) IntraMuscular once  ibuprofen  Tablet. 600 milliGRAM(s) Oral every 6 hours  oxytocin Infusion 333.333 milliUNIT(s)/Min (1000 mL/Hr) IV Continuous <Continuous>  prenatal multivitamin 1 Tablet(s) Oral daily  sodium chloride 0.9% lock flush 3 milliLiter(s) IV Push every 8 hours    MEDICATIONS  (PRN):  benzocaine 20%/menthol 0.5% Spray 1 Spray(s) Topical every 6 hours PRN for Perineal discomfort  dibucaine 1% Ointment 1 Application(s) Topical every 6 hours PRN Perineal discomfort  diphenhydrAMINE 25 milliGRAM(s) Oral every 6 hours PRN Pruritus  hydrocortisone 1% Cream 1 Application(s) Topical every 6 hours PRN Moderate Pain (4-6)  lanolin Ointment 1 Application(s) Topical every 6 hours PRN nipple soreness  magnesium hydroxide Suspension 30 milliLiter(s) Oral two times a day PRN Constipation  oxyCODONE    IR 5 milliGRAM(s) Oral every 3 hours PRN Moderate to Severe Pain (4-10)  oxyCODONE    IR 5 milliGRAM(s) Oral once PRN Moderate to Severe Pain (4-10)  pramoxine 1%/zinc 5% Cream 1 Application(s) Topical every 4 hours PRN Moderate Pain (4-6)  simethicone 80 milliGRAM(s) Chew every 4 hours PRN Gas  witch hazel Pads 1 Application(s) Topical every 4 hours PRN Perineal discomfort      Abdomen soft  fundus firm, non tender  extremities non tender    lochia wnl      Patient doing well  Routine post partum care

## 2022-04-18 NOTE — CHART NOTE - NSCHARTNOTEFT_GEN_A_CORE
Called to see a patient with c/o nausea and dizziness.    S/P  from 22  QBL - 200  VSS, Afebrile    Found patient in bed, in no apparent distress.   Patient stated that she feels dizzy when she closes her eyes and that she has nausea.  No C/O Vomiting.  Able to get OOB to the bathroom without incident.  Tolerating her meals  Was given Reglan X 1  Patient stated that the Reglan was starting to work.    Orthostatic Vital Signs :   -Lying - 119/67, 61  -Sitting - 126/77, 67  -Standing - 109/80, 79    WBC - 8.5 --> 21.2 --> 10.0  H & H - 12.2/37.6 --> 11.8/36.3 --> 11.2/34.4    Plan - Discussed with Dr. Gardner, who will come to see the patient.  S/P CBC (see results). Continue routine Postpartum care.

## 2024-07-26 NOTE — OB RN PATIENT PROFILE - FUNCTIONAL ASSESSMENT - DAILY ACTIVITY 2.
Maternal Fetal Medicine Progress Note    Reason for Consult: chronic hypertension with suspected superimposed pre-eclampsia with severe features    CC: Elevated Bps at home    Hospital Day: 2    Subjective:   Patient seen at bedside. Endorsing good fetal movement, denies contractions, vaginal bleeding, vaginal discharge, leakage of fluid or any other concerns. Denies shortness of breath, headache, vision changes, extremity edema, RUQ pain. Patient feeling much better today off magnesium.     PNI:  #Chronic HTN  - hx hypertensive emergency, seizure, stroke and renal crisis in  requiring dialysis x 2 months   - BPs at home as reported above  - current regimen: Nifedipine XL 60mg BID, Labetalol 300mg TID (increased  from BID), ASA 160mg QD  - s/p EKG NSR, maternal ECHO EF 55%, LVH     #Proteinuria  - baseline 24 hour urine collection 924  -   < 1908  < 1446 3/21   - baseline Cr 0.88 - 0.97      - admitted at [facility] after routine outpatient surveillance labs demonstrated an increase in her creatinine from baseline 0.8 to 1.12 mg/dL, with evaluation performed to workup concerns for superimposed pre-eclampsia vs scleroderma renal crisis     #Scleroderma  - dx in , s/p admission to ICU for hypertensive crisis, seizures, renal crisis treated at [facility]  - Lisinopril 10mg/d until 2024 (stopped due to pregnancy).  Mycophenolate until 2024 (stopped due to pregnancy).   Nifedipine XL 30mg/d since 2024 > 60mg 3/2024  - s/p appointment on  with rheumatologist             - recommended AZA if patient symptomatic             - advised to avoid steroids  - s/p appointment on  with rheumatologist              - recommended azathioprine, patient declined  - s/p appointment on  with rheumatologist               - recommended azathioprine, per Rheumatologist notes, patient willing to start however, has not begun yet              - per Rheumatology note, ok to receive  steroids       #Rh negative   - s/p rhogam at 28 weeks     #Pre-gestational DM   - early 1hr   - insulin recommended, patient declined. Diet-controlled  - A1c 1T 4.9 > 2T 5.0 > 3T 4.9  - QID accuchecks at home, pt reports glucose levels as below:      - fasting: <95      - B: 100-110      - L: 110-130      - D: 100-110  - s/p normal fetal echo     #AMA   - lrNIPT  - ASA 160mg daily     #Fetal growth restriction   - dx @ 28+6 US, EFW 1066g, 6.2%, AC 4.9%, elevated UAD  - started weekly UA dopplers and BPP     #Bilateral fetal pyelectasis  - anatomy US 22+3 b/l fetal pyelectasis L 4.3mm, R 5.4mm  - 28+6 US noting L kidney normal, R pyelectasis  - repeat US at 32wga for further evaluation --> if UTD >7mm, then consider peds uro/nephro      OB U/S: gest age 28w6d, EFW 1066g / 6.2%, AC 4.9%, placenta posterior w/o previa, presentation cephalic, MVP 3.24 cm     OBHx:  (2016) - FT  at 39w5d, 3200g female  G2 - current      PMH: Scleroderma, dx'd 2019 with fatigue. In 2020, pt had seizure, found to be in hypertensive emergency/renal crisis.  Was in ICU. Hospitalized 3wks, underwent hemodialysis x 2 months      PSHx: Breast augmentation     SHx: Denies x 3     FHx: No hx of congenital disorders or bleeding/clotting disorders     Meds: Nifedipine 60mg XL BID, Labetalol 300mg BID, ASA 160mg daily      Allergies: NKA    PE:  Vitals:    24 0400 24 0500 24 0600 24 0700   BP: 134/89 131/89 134/88 (!) 136/96   BP Location:  LUE - Left upper extremity     Patient Position:  Semi-Zabala's     Pulse: 83 84 84 80   Resp:       Temp: 97.7 °F (36.5 °C)  97.9 °F (36.6 °C)    TempSrc:       SpO2:       Weight:       Height:       LMP: 2023       Gen: Alert and oriented, NAD  Abd: Soft, NT, gravid  Ext: no edema, NT    Fetal Heart Tracing:  Time of tracing reviewed: Start: 07 End: 730    Contraction frequency: acontractile    Baseline: 125  Variability:  moderate  Accelerations:  Present  Decelerations: None    Interpretation: Category: 1    A/P: Enriqueta Barrett is a 36 year old  female at 29w6d gestation presenting with REINA and elevated Bps in the context of chronic hypertension with suspected superimposed pre-eclampsia with severe features.     cHTN with suspected superimposed pre-eclampsia with severe features  - baseline preE labs w/ PCR 1446, 24hr urine 924  - current regimen: Nifedipine XL 60mg BID, Labetalol 300mg TID (increased from BID on ), ASA 160mg QD  - Now s/p PO nifedipine 10mg, IV labetalol 20/40mg for sustained severes (~2000 on )  - admit workup: CBC wnl, CMP s/f Cr 1.13 (up from 0.86 1 mo ago), AST/ALT wnl, LDH wnl, uric acid 6.1, PCR 2479 (prev 689 1 mo ago, 1908 3 mos ago)   - Cr now downtrending from 1.13 at admit to 0.93 (baseline ~0.9)  - Low suspicion for abruption given reassuring FHT, endorsing good FM, no ongoing vaginal bleeding, abdominal pain, subjective ctx/cramping     2. Scleroderma, r/o scleroderma renal crisis  - 2020: hypertensive crisis, seizure, renal crisis, ICU admission, dialysis x 2 months.  -s/p  appointment with rheumatologist, rec azathioprine, has not yet started on admit  -cleared by rheum & renal for BMTZ   - Admit Cr 1.13 > 0.9 > 0.89 > 0.93  - Patient has not been compliant in following up with Nephrology   - Nephrology following - no e/o scleroderma renal crisis (more c/f preeclampsia given PCR on admission), okay with BTMZ given small dose      3. Prematurity in setting of fetal growth restriction   - Despite extensive counseling from OB team and Neonatology team, patient declining betamethasone for fetal lung maturation   - AMA form signed   -  Most recent ARC US 28+6: 1066g 6%, AC 4.9%, placenta posterior, cephalic, MVP 3.24 cm  - UAD found to be elevated at 28+6 and again at 29+5 (on )   - s/p magnesium 24hours     Dispo: L&D in stable condition     Will discuss with VERONICA Small attending.      Maida  Stewart, DO   Obstetrics and Gynecology PGY2   4 = No assist / stand by assistance
